# Patient Record
Sex: FEMALE
[De-identification: names, ages, dates, MRNs, and addresses within clinical notes are randomized per-mention and may not be internally consistent; named-entity substitution may affect disease eponyms.]

---

## 2020-01-15 PROBLEM — Z00.00 ENCOUNTER FOR PREVENTIVE HEALTH EXAMINATION: Status: ACTIVE | Noted: 2020-01-15

## 2020-02-27 ENCOUNTER — APPOINTMENT (OUTPATIENT)
Dept: ENDOCRINOLOGY | Facility: CLINIC | Age: 65
End: 2020-02-27
Payer: COMMERCIAL

## 2020-02-27 VITALS
HEART RATE: 101 BPM | HEIGHT: 58 IN | SYSTOLIC BLOOD PRESSURE: 152 MMHG | BODY MASS INDEX: 35.68 KG/M2 | DIASTOLIC BLOOD PRESSURE: 88 MMHG | WEIGHT: 170 LBS

## 2020-02-27 PROCEDURE — 99205 OFFICE O/P NEW HI 60 MIN: CPT

## 2020-02-28 ENCOUNTER — FORM ENCOUNTER (OUTPATIENT)
Age: 65
End: 2020-02-28

## 2020-02-28 NOTE — ASSESSMENT
[FreeTextEntry1] : 64 y/o F with:\par \par 1. Hx of hypothyroidism (dx ~10 years ago)\par Pt is on Synthroid 25 mcg for over 8 years. She has been taking this dose since when it medication was increased to 50 mcg and she developed " side effect". Pt appears to be euthyroid.\par Ordered TFTs today, will call pat with results\par \par 2. Hx of multinodular goiter \par Pt is asymptomatic. Pt  had two FNA biopsies: [place] II. One of the nodules involving the isthmus extending  to the mid pole of left lobe measures 2.5 cm ( 11/27/18) \par \par Return in: 6 months [Levothyroxine] : The patient was instructed to take Levothyroxine on an empty stomach, separate from vitamins, and wait at least 30 minutes before eating

## 2020-02-28 NOTE — REVIEW OF SYSTEMS
[Recent Weight Gain (___ Lbs)] : recent [unfilled] ~Ulb weight gain [Negative] : Endocrine [FreeTextEntry4] : reports voice hoarseness, regurgitation

## 2020-02-28 NOTE — HISTORY OF PRESENT ILLNESS
[FreeTextEntry1] : 64 y/o F pt, with Hx of thyroid nodules (dx in 2015, and started on thyroid supplementation since dx) and Hx of  hypothyroidism (dx 8-10 yrs ago), referred by , presents today to establish endocrine care with me.\par Other PMHx: Osteoporosis, hernia\par Denies Hx of bone fractures. Denies head and neck radiation\par PSHx: Total Knee Replacement (8/10/17), R ankle and toe surgery (4/4/18)\par FHx: DM (mother dx at age 70), CAD (father), Alzheimer's disease (mother)\par Denies FHx of thyroid disorder.  \par SHx: Non-smoker. 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 \par LMP: 2001\par \par - 1/20/16: R lobe 1.7 cm nodule FNA Biopsy: Hyperplasia with chronic lymphocytic thyroiditis. \par - 11/27/18: L thyroid mid-lobe/isthmus 2.5 cm heterogenous nodule biopsy: Lymphocytic thyroiditis. \par - 6/4/19 Thyroid US: To the L of isthmus, there is a nodule measuring 2.1 x 1.5 x 0.5 cm.\par - 10/4/19: A1c 5.6%, s.creat 0.75, TSH 6.2, Free T4 0.93, Free T3 3.1, TG 56, LDL-c 114, Ca 8.8\par \par 02/27/2020\par Pt reports that her physician at [facility] who was managing her thyroid nodules has retired. She was previously informed that she has Hashimoto's thyroiditis.\par \par Pt presents today with c/o voice hoarseness, regurgitation, and weight gain. Pt reports that her previous symptoms of GI disturbances have resolved. \par \par Current Medications: Synthroid 25 mcg QD, Losartan 50 mg QPM, Metoprolol ER 25 mg QAM, Omeprazole 40 mg QD, Probiotics

## 2020-02-28 NOTE — ADDENDUM
[FreeTextEntry1] : I, Rhoda White, acted solely as a scribe for Dr. Nate Lilly on this date. 02/27/2020.

## 2020-02-28 NOTE — END OF VISIT
[FreeTextEntry3] : All medical record entries made by the Scribe were at my, Dr. Nate Lilly, direction and personally dictated by me on 02/27/2020. I have reviewed the chart and agree that the record accurately reflects my personal performance of the history, physical exam, assessment and plan. I have also personally directed, reviewed and agreed with the chart.  [>50% of Time Spent on Counseling for ____] : Greater than 50% of the encounter time was spent on counseling for [unfilled] [Time Spent: ___ minutes] : I have spent [unfilled] minutes of face to face time with the patient

## 2020-02-29 ENCOUNTER — OUTPATIENT (OUTPATIENT)
Dept: OUTPATIENT SERVICES | Facility: HOSPITAL | Age: 65
LOS: 1 days | End: 2020-02-29

## 2020-02-29 ENCOUNTER — APPOINTMENT (OUTPATIENT)
Dept: ULTRASOUND IMAGING | Facility: CLINIC | Age: 65
End: 2020-02-29
Payer: COMMERCIAL

## 2020-02-29 PROCEDURE — 76536 US EXAM OF HEAD AND NECK: CPT | Mod: 26

## 2020-03-23 ENCOUNTER — APPOINTMENT (OUTPATIENT)
Dept: ENDOCRINOLOGY | Facility: CLINIC | Age: 65
End: 2020-03-23

## 2020-03-23 LAB
T4 FREE SERPL-MCNC: 1.2 NG/DL
THYROGLOB AB SERPL-ACNC: 2596 IU/ML
THYROPEROXIDASE AB SERPL IA-ACNC: 1398 IU/ML
TSH SERPL-ACNC: 4.01 UIU/ML

## 2020-10-15 ENCOUNTER — OUTPATIENT (OUTPATIENT)
Dept: OUTPATIENT SERVICES | Facility: HOSPITAL | Age: 65
LOS: 1 days | End: 2020-10-15

## 2020-10-15 ENCOUNTER — RESULT REVIEW (OUTPATIENT)
Age: 65
End: 2020-10-15

## 2020-10-15 ENCOUNTER — APPOINTMENT (OUTPATIENT)
Dept: ULTRASOUND IMAGING | Facility: CLINIC | Age: 65
End: 2020-10-15
Payer: COMMERCIAL

## 2020-10-15 PROCEDURE — 88305 TISSUE EXAM BY PATHOLOGIST: CPT | Mod: 26

## 2020-10-15 PROCEDURE — 10006 FNA BX W/US GDN EA ADDL: CPT

## 2020-10-15 PROCEDURE — 10005 FNA BX W/US GDN 1ST LES: CPT

## 2020-10-15 PROCEDURE — 88173 CYTOPATH EVAL FNA REPORT: CPT | Mod: 26

## 2020-10-20 LAB
NON-GYNECOLOGICAL CYTOLOGY STUDY: SIGNIFICANT CHANGE UP
NON-GYNECOLOGICAL CYTOLOGY STUDY: SIGNIFICANT CHANGE UP

## 2020-11-16 ENCOUNTER — APPOINTMENT (OUTPATIENT)
Dept: ENDOCRINOLOGY | Facility: CLINIC | Age: 65
End: 2020-11-16
Payer: COMMERCIAL

## 2020-11-16 VITALS
DIASTOLIC BLOOD PRESSURE: 98 MMHG | HEART RATE: 107 BPM | BODY MASS INDEX: 37.62 KG/M2 | WEIGHT: 180 LBS | SYSTOLIC BLOOD PRESSURE: 157 MMHG

## 2020-11-16 PROCEDURE — 99072 ADDL SUPL MATRL&STAF TM PHE: CPT

## 2020-11-16 PROCEDURE — 99214 OFFICE O/P EST MOD 30 MIN: CPT | Mod: 25

## 2020-11-16 NOTE — HISTORY OF PRESENT ILLNESS
[FreeTextEntry1] : 64 y/o F pt, with Hx of thyroid nodules (dx in 2015, and started on thyroid supplementation since dx) and Hx of hypothyroidism (dx 8-10 yrs ago).\par Other PMHx: Osteoporosis, hernia\par Denies Hx of bone fractures. Denies head and neck radiation\par PSHx: Total Knee Replacement (8/10/17), R ankle and toe surgery (4/4/18)\par FHx: DM (mother dx at age 70), CAD (father), Alzheimer's disease (mother)\par Denies FHx of thyroid disorder.  \par SHx: Non-smoker. 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 \par LMP: 2001\par \par 02/27/2020\par Pt reports that her physician at [facility] who was managing her thyroid nodules has retired. She was previously informed that she has Hashimoto's thyroiditis.\par \par Pt presents today with c/o voice hoarseness, regurgitation, and weight gain. Pt reports that her previous symptoms of GI disturbances have resolved. \par \par 11/16/20\par Today pt presents for thyroid f/u, feeling well with no major physical complaints. She states she is recovering from Shingles. \par \par Current Medications: Synthroid 25 mcg QD, Losartan 50 mg QPM, Metoprolol ER 25 mg QAM, Omeprazole 40 mg QD, Probiotics\par \par Recent Labs:\par - 10/15/20 Thyroid FNA Biopsy: \par Thyroid Isthmus Benign Findings (Category II). \par Right Upper Thyroid Benign Findings (Category II). \par - 2/29/20 Thyroid US: \par R Lobe contains 1 nodule at R Upper Pole, 1.5 x 0.8 x 1.1cm, solid hypoechoic. \par L Lobe contains no dominant nodules. \par Isthmus lobes contains 1 nodule to the L of the Midline, 1.5 x 0.5 x 1.2cm, solid hypoechoic. \par No abnormal lymph nodes are identified in the neck.\par - 2/27/20 TPO ab 1398.0, Thyroglobulin ab 2596.0, TSH 4.01, Free T4 1.2, \par - 10/4/19: A1c 5.6%, s.creat 0.75, TSH 6.2, Free T4 0.93, Free T3 3.1, TG 56, LDL-c 114, Ca 8.8\par - 6/4/19 Thyroid US: \par R Upper Pole Nodule 2.0 x 1.0 x 0.8cm, spongiform.\par To the L of isthmus, there is a nodule measuring 2.1 x 1.5 x 0.5 cm (which was sampled on 11/27/18, FNA showing Lymphocytic thyroiditis)\par - 11/27/18: FNA Biopsy: L thyroid mid-lobe/isthmus 2.5 cm heterogenous nodule biopsy: Lymphocytic thyroiditis. \par - 6/6/17 R Upper Pole Spongiform Nodule measure 1.3 x 1.0 x 0.8cm, it was previously sampled. L Lobe with Complex Hypoechoic Nodule.\par - 8/4/16 Thyroid US: R Upper Pole Spongiform Nodule measures 1.4 x 0.8 x 0.7cm, nonsuspicious. This was previously sampled. \par - 1/20/16: FNA Biopsy: R lobe 1.7 cm nodule: Hyperplasia with chronic lymphocytic thyroiditis. \par - 10/9/15 Thyroid US: R Mid Upper Pole Nodule 0.9cm and a R Mid Pole Nodule 1.1cm.

## 2020-11-16 NOTE — PHYSICAL EXAM
[Alert] : alert [Normal Sclera/Conjunctiva] : normal sclera/conjunctiva [Normal Outer Ear/Nose] : the ears and nose were normal in appearance [No Respiratory Distress] : no respiratory distress [Clear to Auscultation] : lungs were clear to auscultation bilaterally [Normal S1, S2] : normal S1 and S2 [Normal Rate] : heart rate was normal [Regular Rhythm] : with a regular rhythm [No Edema] : no peripheral edema [Normal Bowel Sounds] : normal bowel sounds [Spine Straight] : spine straight [No Stigmata of Cushings Syndrome] : no stigmata of Cushings Syndrome [Normal Gait] : normal gait [No Rash] : no rash [Normal Reflexes] : deep tendon reflexes were 2+ and symmetric [Oriented x3] : oriented to person, place, and time

## 2020-11-16 NOTE — END OF VISIT
[FreeTextEntry3] : All medical record entries made by the Scribe were at my, Dr. Nate Lilly, direction and personally dictated by me on 11/16/2020. I have reviewed the chart and agree that the record accurately reflects my personal performance of the history, physical exam, assessment and plan. I have also personally directed, reviewed and agreed with the chart.  [Time Spent: ___ minutes] : I have spent [unfilled] minutes of time on the encounter. [>50% of the face to face encounter time was spent on counseling and/or coordination of care for ___] : Greater than 50% of the face to face encounter time was spent on counseling and/or coordination of care for [unfilled]

## 2020-11-16 NOTE — ASSESSMENT
[FreeTextEntry1] : 66 y/o F with:\par \par 1. Hashimoto Hypothyroidism (dx ~10 years ago)\par Pt is on Synthroid 25 mcg for over 8 years. She is euthyroid. Pt had additional questions about her condition, including why she had multiple biopsies in the past and Hashimoto hypothyroidism. I answered all her questions to my best knowledge, which she is pleased with. \par \par 2. B/L Thyroid Nodules:\par Pt has had several biopsies, and her recent FNA Biopsy was in 10/2020 for: \par Thyroid Isthmus Benign Findings ([place] II). \par Right Upper Thyroid Benign Findings ([place] II). \par \par Return in 1 yr.  [Levothyroxine] : The patient was instructed to take Levothyroxine on an empty stomach, separate from vitamins, and wait at least 30 minutes before eating

## 2020-11-16 NOTE — ADDENDUM
[FreeTextEntry1] : I, Taylor Bermudez, acted solely as a scribe for Dr. Nate Lilly on this date. 11/16/2020.

## 2021-03-02 ENCOUNTER — APPOINTMENT (OUTPATIENT)
Dept: ENDOCRINOLOGY | Facility: CLINIC | Age: 66
End: 2021-03-02
Payer: COMMERCIAL

## 2021-03-02 VITALS
DIASTOLIC BLOOD PRESSURE: 88 MMHG | HEART RATE: 97 BPM | BODY MASS INDEX: 37.62 KG/M2 | SYSTOLIC BLOOD PRESSURE: 159 MMHG | WEIGHT: 180 LBS

## 2021-03-02 PROCEDURE — 99072 ADDL SUPL MATRL&STAF TM PHE: CPT

## 2021-03-02 PROCEDURE — 99214 OFFICE O/P EST MOD 30 MIN: CPT

## 2021-03-04 NOTE — ADDENDUM
[FreeTextEntry1] : I, Taylor Bermudez, acted solely as a scribe for Dr. Nate Lilly on this date. 03/02/2021.

## 2021-03-04 NOTE — PHYSICAL EXAM
[Alert] : alert [Normal Sclera/Conjunctiva] : normal sclera/conjunctiva [Normal Outer Ear/Nose] : the ears and nose were normal in appearance [No Respiratory Distress] : no respiratory distress [Clear to Auscultation] : lungs were clear to auscultation bilaterally [Normal S1, S2] : normal S1 and S2 [Normal Rate] : heart rate was normal [Regular Rhythm] : with a regular rhythm [No Edema] : no peripheral edema [Normal Bowel Sounds] : normal bowel sounds [Spine Straight] : spine straight [No Stigmata of Cushings Syndrome] : no stigmata of Cushings Syndrome [Normal Gait] : normal gait [No Rash] : no rash [Normal Reflexes] : deep tendon reflexes were 2+ and symmetric [Oriented x3] : oriented to person, place, and time [de-identified] : R Lobe palpated

## 2021-03-04 NOTE — ASSESSMENT
[Levothyroxine] : The patient was instructed to take Levothyroxine on an empty stomach, separate from vitamins, and wait at least 30 minutes before eating [FreeTextEntry1] : 67 y/o F with:\par \par 1. Hashimoto Hypothyroidism (dx ~10 years ago):\par Pt is c/o Shingles and has been rx Lyrica with some degree of relief. Her recent TSH was increased to 13.57 in 1/2021.\par Recommend pt switch from Synthroid to Levothyroxine  25 mcg 4x7 and take 50 mcg 3x7. \par \par 2. B/L Thyroid Nodules:\par Pt has had several biopsies, and her recent FNA Biopsy was in 10/2020 for:\par Thyroid Isthmus Benign Findings ([place] II). \par Right Upper Thyroid Benign Findings ([place] II). \par No speech/swallowing/breathing difficulties. \par  \par Return in 2022.

## 2021-03-04 NOTE — END OF VISIT
[FreeTextEntry3] : All medical record entries made by the Scribe were at my, Dr. Nate Lilly, direction and personally dictated by me on 03/02/2021. I have reviewed the chart and agree that the record accurately reflects my personal performance of the history, physical exam, assessment and plan. I have also personally directed, reviewed and agreed with the chart.  [Time Spent: ___ minutes] : I have spent [unfilled] minutes of time on the encounter.

## 2021-03-04 NOTE — HISTORY OF PRESENT ILLNESS
[FreeTextEntry1] : 67 y/o F pt, with Hx of thyroid nodules (dx in 2015, and started on thyroid supplementation since dx) and Hx of hypothyroidism (dx 8-10 yrs ago).\par Other PMHx: Osteoporosis, hernia\par Denies PMHx of bone fracture\par Denies Hx of bone fractures. Denies head and neck radiation\par PSHx: Total Knee Replacement (8/10/17), R ankle and toe surgery (4/4/18)\par FHx: DM (mother dx at age 70), CAD (father), Alzheimer's disease (mother)\par Denies FHx of thyroid disorder.  \par SHx: Non-smoker. 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 \par LMP: 2001\par \par 3/2/21\par Today pt presents for thyroid f/u, feeling well with no major physical complaints. Pt notes her main concern is her Shingles; she states she was rx Lyrica 50 BID. \par Pt reports she has been consistent with LT4 25mcg. Pt notes the last time her LT4 dose was increased her weight increased. \par Pt notes she did a BMD last week which was ordered by her doctor. She states the results are not out yet.\par \par Current Medications: Synthroid 25 mcg QD, Losartan 50 mg QPM, Metoprolol ER 25 mg QAM, Omeprazole 40 mg QD, Probiotics\par \par Recent Labs:\par - 1/15/21 TSH 13.57, Total T4 7.2, Total T3 126, A1c 5.6%, Vit D 25 OH 14, s. creat 0.67, Ca 9.8, LDL-c 124, Cholesterol 209, \par - 10/15/20 Thyroid FNA Biopsy: \par Thyroid Isthmus Benign Findings (Category II). \par Right Upper Thyroid Benign Findings (Category II). \par - 2/29/20 Thyroid US: \par R Lobe contains 1 nodule at R Upper Pole, 1.5 x 0.8 x 1.1cm, solid hypoechoic. \par L Lobe contains no dominant nodules. \par Isthmus lobes contains 1 nodule to the L of the Midline, 1.5 x 0.5 x 1.2cm, solid hypoechoic. \par No abnormal lymph nodes are identified in the neck.\par - 2/27/20 TPO ab 1398.0, Thyroglobulin ab 2596.0, TSH 4.01, Free T4 1.2, \par - 10/4/19: A1c 5.6%, s.creat 0.75, TSH 6.2, Free T4 0.93, Free T3 3.1, TG 56, LDL-c 114, Ca 8.8\par - 6/4/19 Thyroid US: \par R Upper Pole Nodule 2.0 x 1.0 x 0.8cm, spongiform.\par To the L of isthmus, there is a nodule measuring 2.1 x 1.5 x 0.5 cm (which was sampled on 11/27/18, FNA showing Lymphocytic thyroiditis)\par - 11/27/18: FNA Biopsy: L thyroid mid-lobe/isthmus 2.5 cm heterogenous nodule biopsy: Lymphocytic thyroiditis. \par - 6/6/17 R Upper Pole Spongiform Nodule measure 1.3 x 1.0 x 0.8cm, it was previously sampled. L Lobe with Complex Hypoechoic Nodule.\par - 8/4/16 Thyroid US: R Upper Pole Spongiform Nodule measures 1.4 x 0.8 x 0.7cm, nonsuspicious. This was previously sampled. \par - 1/20/16: FNA Biopsy: R lobe 1.7 cm nodule: Hyperplasia with chronic lymphocytic thyroiditis. \par - 10/9/15 Thyroid US: R Mid Upper Pole Nodule 0.9cm and a R Mid Pole Nodule 1.1cm.

## 2021-06-25 ENCOUNTER — NON-APPOINTMENT (OUTPATIENT)
Age: 66
End: 2021-06-25

## 2022-03-30 ENCOUNTER — APPOINTMENT (OUTPATIENT)
Dept: ENDOCRINOLOGY | Facility: CLINIC | Age: 67
End: 2022-03-30
Payer: COMMERCIAL

## 2022-03-30 VITALS
SYSTOLIC BLOOD PRESSURE: 151 MMHG | HEART RATE: 72 BPM | BODY MASS INDEX: 35.53 KG/M2 | DIASTOLIC BLOOD PRESSURE: 81 MMHG | WEIGHT: 170 LBS

## 2022-03-30 DIAGNOSIS — E03.9 HYPOTHYROIDISM, UNSPECIFIED: ICD-10-CM

## 2022-03-30 DIAGNOSIS — E04.2 NONTOXIC MULTINODULAR GOITER: ICD-10-CM

## 2022-03-30 PROCEDURE — 99214 OFFICE O/P EST MOD 30 MIN: CPT

## 2022-03-30 NOTE — REASON FOR VISIT
[Follow - Up] : a follow-up visit [Hypothyroidism] : hypothyroidism [Thyroid nodule/ MNG] : thyroid nodule/ MNG Strong peripheral pulses

## 2022-03-31 NOTE — HISTORY OF PRESENT ILLNESS
[FreeTextEntry1] : 66 y/o F pt, with Hx of thyroid nodules (dx in 2015, and started on thyroid supplementation since dx) and Hx of hypothyroidism (dx 8-10 yrs ago).\par Other PMHx: Osteoporosis, hernia, "PNH" (chronic flares). Denies PMHx of bone fracture, head and neck radiation. \par PSHx: Total Knee Replacement (8/10/17), R ankle and toe surgery (4/4/18)\par FHx: DM (mother dx at age 70), CAD (father), Alzheimer's disease (mother)\par Denies FHx of thyroid disorder.  \par SHx: Non-smoker. 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 \par LMP: 2001\par \par 3/2/21\par Today pt presents for thyroid f/u, feeling well with no major physical complaints. Pt notes her main concern is her Shingles; she states she was rx Lyrica 50 BID. \par Pt reports she has been consistent with LT4 25mcg. Pt notes the last time her LT4 dose was increased her weight increased. \par Pt notes she did a BMD last week which was ordered by her doctor. She states the results are not out yet.\par \par 03/30/2022\par Pt presents today for endocrine f/u, feeling , with c/o "flare" on top of her head, which affects her eye. 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 This is being followed up by her neurologist, "Dr. Mack". \par Pt complains of occasional dysphonia with hoarseness. R side of her throat also hurts upon swallowing. \par Denies difficulty breathing. \par \par Current Medications: Levothyroxine 25 mcg 4x7 and 50 mcg 3x7 (switched from Synthroid on 06/25/21), Losartan 50 mg QPM, Metoprolol ER 25 mg QAM, Omeprazole 40 mg QD, Probiotics\par \par Labs:\par - 1/15/21 TSH 13.57, Total T4 7.2, Total T3 126, A1c 5.6%, Vit D 25 OH 14, s. creat 0.67, Ca 9.8, LDL-c 124, Cholesterol 209\par - 2/27/20 TPO ab 1398.0, Thyroglobulin ab 2596.0, TSH 4.01, Free T4 1.2, \par - 10/4/19: A1c 5.6%, s.creat 0.75, TSH 6.2, Free T4 0.93, Free T3 3.1, TG 56, LDL-c 114, Ca 8.8\par \par Imaging:\par - 10/15/20 Thyroid FNA Biopsy: Thyroid Isthmus Benign Findings (Category II). Right Upper Thyroid Benign Findings (Category II). \par - 2/29/20 Thyroid US: R Lobe contains 1 nodule at R Upper Pole, 1.5 x 0.8 x 1.1 cm, solid hypoechoic. L Lobe contains no dominant nodules. Isthmus lobes contains 1 nodule to the L of the Midline, 1.5 x 0.5 x 1.2 cm, solid hypoechoic. No abnormal lymph nodes are identified in the neck.\par - 6/4/19 Thyroid US: R Upper Pole Nodule 2.0 x 1.0 x 0.8 cm, spongiform. To the L of isthmus, there is a nodule measuring 2.1 x 1.5 x 0.5 cm (which was sampled on 11/27/18, FNA showing Lymphocytic thyroiditis)\par - 11/27/18: FNA Biopsy: L thyroid mid-lobe/isthmus 2.5 cm heterogenous nodule biopsy: Lymphocytic thyroiditis. \par - 6/6/17 R Upper Pole Spongiform Nodule measure 1.3 x 1.0 x 0.8 cm, it was previously sampled. L Lobe with Complex Hypoechoic Nodule.\par - 8/4/16 Thyroid US: R Upper Pole Spongiform Nodule measures 1.4 x 0.8 x 0.7 cm, nonsuspicious. This was previously sampled. \par - 1/20/16: FNA Biopsy: R lobe 1.7 cm nodule: Hyperplasia with chronic lymphocytic thyroiditis. \par - 10/9/15 Thyroid US: R Mid Upper Pole Nodule 0.9cm and a R Mid Pole Nodule 1.1 cm.

## 2022-03-31 NOTE — REVIEW OF SYSTEMS
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  [de-identified] : Flare in the upper forehead. Developed PNH as a result.

## 2022-03-31 NOTE — ADDENDUM
[FreeTextEntry1] : I Corbin Andrew act soley as a scribe for Dr. Nate Lilly on this date. 03/30/2022

## 2022-03-31 NOTE — ASSESSMENT
[FreeTextEntry1] : 66 y/o F with:\par \par 1. Hashimoto Hypothyroidism diagnosed ~10 years ago:\par Pt appears to be clinically euthyroid. She is currently on Levothyroxine  25 mcg 4x7 and take 50 mcg 3x7. \par Send in TFT today. Will contact pt with report. \par \par 2. B/L Thyroid Nodules diagnosed in 2015:\par Pt had 2 FNA biopsies. She is asymptomatic. \par Send in Thyroid US. Will discuss with pt upon receiving the report. \par \par Return in 6 months.  [Levothyroxine] : The patient was instructed to take Levothyroxine on an empty stomach, separate from vitamins, and wait at least 30 minutes before eating

## 2022-03-31 NOTE — DATA REVIEWED
[FreeTextEntry1] : Labs:\par - 1/15/21 TSH 13.57, Total T4 7.2, Total T3 126, A1c 5.6%, Vit D 25 OH 14, s. creat 0.67, Ca 9.8, LDL-c 124, Cholesterol 209\par - 2/27/20 TPO ab 1398.0, Thyroglobulin ab 2596.0, TSH 4.01, Free T4 1.2, \par - 10/4/19: A1c 5.6%, s.creat 0.75, TSH 6.2, Free T4 0.93, Free T3 3.1, TG 56, LDL-c 114, Ca 8.8\par \par Imaging:\par - 10/15/20 Thyroid FNA Biopsy: Thyroid Isthmus Benign Findings (Category II). Right Upper Thyroid Benign Findings (Category II). \par - 2/29/20 Thyroid US: R Lobe contains 1 nodule at R Upper Pole, 1.5 x 0.8 x 1.1cm, solid hypoechoic. L Lobe contains no dominant nodules. Isthmus lobes contains 1 nodule to the L of the Midline, 1.5 x 0.5 x 1.2cm, solid hypoechoic. No abnormal lymph nodes are identified in the neck.\par - 6/4/19 Thyroid US: R Upper Pole Nodule 2.0 x 1.0 x 0.8cm, spongiform. To the L of isthmus, there is a nodule measuring 2.1 x 1.5 x 0.5 cm (which was sampled on 11/27/18, FNA showing Lymphocytic thyroiditis)\par - 11/27/18: FNA Biopsy: L thyroid mid-lobe/isthmus 2.5 cm heterogenous nodule biopsy: Lymphocytic thyroiditis. \par - 6/6/17 R Upper Pole Spongiform Nodule measure 1.3 x 1.0 x 0.8cm, it was previously sampled. L Lobe with Complex Hypoechoic Nodule.\par - 8/4/16 Thyroid US: R Upper Pole Spongiform Nodule measures 1.4 x 0.8 x 0.7cm, nonsuspicious. This was previously sampled. \par - 1/20/16: FNA Biopsy: R lobe 1.7 cm nodule: Hyperplasia with chronic lymphocytic thyroiditis. \par - 10/9/15 Thyroid US: R Mid Upper Pole Nodule 0.9cm and a R Mid Pole Nodule 1.1cm.

## 2022-03-31 NOTE — PHYSICAL EXAM
[Alert] : alert [Normal Sclera/Conjunctiva] : normal sclera/conjunctiva [Normal Outer Ear/Nose] : the ears and nose were normal in appearance [No Thyroid Nodules] : no palpable thyroid nodules [No Respiratory Distress] : no respiratory distress [Normal Rate] : heart rate was normal [Regular Rhythm] : with a regular rhythm [No Edema] : no peripheral edema [Normal Bowel Sounds] : normal bowel sounds [Spine Straight] : spine straight [No Stigmata of Cushings Syndrome] : no stigmata of Cushings Syndrome [Normal Gait] : normal gait [Normal Reflexes] : deep tendon reflexes were 2+ and symmetric [Oriented x3] : oriented to person, place, and time [de-identified] : Palpation on R side (hard). No nodules palpated.

## 2022-03-31 NOTE — END OF VISIT
[FreeTextEntry3] : All medical record entries made by the Scribe were at my, Dr. Nate Lilly, direction and personally dictated by me on 03/30/2022. I have reviewed the chart and agree that the record accurately reflects my personal performance of the history, physical exam, assessment and plan. I have also personally directed, reviewed and agreed with the chart.  [Time Spent: ___ minutes] : I have spent [unfilled] minutes of time on the encounter.

## 2022-04-13 ENCOUNTER — TRANSCRIPTION ENCOUNTER (OUTPATIENT)
Age: 67
End: 2022-04-13

## 2022-04-28 ENCOUNTER — NON-APPOINTMENT (OUTPATIENT)
Age: 67
End: 2022-04-28

## 2022-05-25 ENCOUNTER — RX RENEWAL (OUTPATIENT)
Age: 67
End: 2022-05-25

## 2022-05-25 RX ORDER — LEVOTHYROXINE SODIUM 0.03 MG/1
25 TABLET ORAL
Qty: 130 | Refills: 3 | Status: ACTIVE | COMMUNITY
Start: 2021-03-02 | End: 1900-01-01

## 2022-11-14 ENCOUNTER — APPOINTMENT (OUTPATIENT)
Dept: ENDOCRINOLOGY | Facility: CLINIC | Age: 67
End: 2022-11-14